# Patient Record
Sex: MALE | Race: BLACK OR AFRICAN AMERICAN | NOT HISPANIC OR LATINO | Employment: OTHER | URBAN - METROPOLITAN AREA
[De-identification: names, ages, dates, MRNs, and addresses within clinical notes are randomized per-mention and may not be internally consistent; named-entity substitution may affect disease eponyms.]

---

## 2017-07-29 ENCOUNTER — OFFICE VISIT (OUTPATIENT)
Dept: URGENT CARE | Facility: URGENT CARE | Age: 27
End: 2017-07-29
Payer: COMMERCIAL

## 2017-07-29 VITALS
TEMPERATURE: 97.9 F | WEIGHT: 190 LBS | SYSTOLIC BLOOD PRESSURE: 118 MMHG | DIASTOLIC BLOOD PRESSURE: 68 MMHG | HEART RATE: 68 BPM | BODY MASS INDEX: 27.26 KG/M2 | OXYGEN SATURATION: 98 %

## 2017-07-29 DIAGNOSIS — R10.13 ABDOMINAL PAIN, EPIGASTRIC: ICD-10-CM

## 2017-07-29 DIAGNOSIS — R07.9 CHEST PAIN, UNSPECIFIED TYPE: Primary | ICD-10-CM

## 2017-07-29 LAB
ALBUMIN SERPL-MCNC: 3.7 G/DL (ref 3.4–5)
ALP SERPL-CCNC: 81 U/L (ref 40–150)
ALT SERPL W P-5'-P-CCNC: 23 U/L (ref 0–70)
AMYLASE SERPL-CCNC: 64 U/L (ref 30–110)
ANION GAP SERPL CALCULATED.3IONS-SCNC: 4 MMOL/L (ref 3–14)
AST SERPL W P-5'-P-CCNC: 14 U/L (ref 0–45)
BASOPHILS # BLD AUTO: 0 10E9/L (ref 0–0.2)
BASOPHILS NFR BLD AUTO: 0.6 %
BILIRUB SERPL-MCNC: 0.3 MG/DL (ref 0.2–1.3)
BUN SERPL-MCNC: 10 MG/DL (ref 7–30)
CALCIUM SERPL-MCNC: 8.8 MG/DL (ref 8.5–10.1)
CHLORIDE SERPL-SCNC: 104 MMOL/L (ref 94–109)
CO2 SERPL-SCNC: 30 MMOL/L (ref 20–32)
CREAT SERPL-MCNC: 0.83 MG/DL (ref 0.66–1.25)
DIFFERENTIAL METHOD BLD: NORMAL
EOSINOPHIL # BLD AUTO: 0.2 10E9/L (ref 0–0.7)
EOSINOPHIL NFR BLD AUTO: 3 %
ERYTHROCYTE [DISTWIDTH] IN BLOOD BY AUTOMATED COUNT: 12.7 % (ref 10–15)
GFR SERPL CREATININE-BSD FRML MDRD: ABNORMAL ML/MIN/1.7M2
GLUCOSE SERPL-MCNC: 106 MG/DL (ref 70–99)
HCT VFR BLD AUTO: 40.2 % (ref 40–53)
HGB BLD-MCNC: 13.4 G/DL (ref 13.3–17.7)
LYMPHOCYTES # BLD AUTO: 1.8 10E9/L (ref 0.8–5.3)
LYMPHOCYTES NFR BLD AUTO: 34.8 %
MCH RBC QN AUTO: 28.2 PG (ref 26.5–33)
MCHC RBC AUTO-ENTMCNC: 33.3 G/DL (ref 31.5–36.5)
MCV RBC AUTO: 85 FL (ref 78–100)
MONOCYTES # BLD AUTO: 0.4 10E9/L (ref 0–1.3)
MONOCYTES NFR BLD AUTO: 8.1 %
NEUTROPHILS # BLD AUTO: 2.7 10E9/L (ref 1.6–8.3)
NEUTROPHILS NFR BLD AUTO: 53.5 %
PLATELET # BLD AUTO: 203 10E9/L (ref 150–450)
POTASSIUM SERPL-SCNC: 4.3 MMOL/L (ref 3.4–5.3)
PROT SERPL-MCNC: 8 G/DL (ref 6.8–8.8)
RBC # BLD AUTO: 4.75 10E12/L (ref 4.4–5.9)
SODIUM SERPL-SCNC: 138 MMOL/L (ref 133–144)
WBC # BLD AUTO: 5.1 10E9/L (ref 4–11)

## 2017-07-29 PROCEDURE — 80053 COMPREHEN METABOLIC PANEL: CPT | Performed by: FAMILY MEDICINE

## 2017-07-29 PROCEDURE — 82150 ASSAY OF AMYLASE: CPT | Performed by: FAMILY MEDICINE

## 2017-07-29 PROCEDURE — 85025 COMPLETE CBC W/AUTO DIFF WBC: CPT | Performed by: FAMILY MEDICINE

## 2017-07-29 PROCEDURE — 99214 OFFICE O/P EST MOD 30 MIN: CPT | Performed by: FAMILY MEDICINE

## 2017-07-29 PROCEDURE — 93000 ELECTROCARDIOGRAM COMPLETE: CPT | Performed by: FAMILY MEDICINE

## 2017-07-29 PROCEDURE — 36415 COLL VENOUS BLD VENIPUNCTURE: CPT | Performed by: FAMILY MEDICINE

## 2017-07-29 NOTE — MR AVS SNAPSHOT
"              After Visit Summary   7/29/2017    Stefani Kirkpatrick    MRN: [id number]           Patient Information     Date Of Birth          1990        Visit Information        Provider Department      7/29/2017 3:35 PM Nolberto Galvan,  [facility]        Today's Diagnoses     Chest pain, unspecified type    -  1    Abdominal pain, epigastric           Follow-ups after your visit        Future tests that were ordered for you today     Open Future Orders        Priority Expected Expires Ordered    H Pylori antigen, stool Routine  8/28/2017 7/29/2017            Who to contact     If you have questions or need follow up information about today's clinic visit or your schedule please contact [facility] directly at [phone].  Normal or non-critical lab and imaging results will be communicated to you by MyChart, letter or phone within 4 business days after the clinic has received the results. If you do not hear from us within 7 days, please contact the clinic through MyChart or phone. If you have a critical or abnormal lab result, we will notify you by phone as soon as possible.  Submit refill requests through [employer] or call your pharmacy and they will forward the refill request to us. Please allow 3 business days for your refill to be completed.          Additional Information About Your Visit        MyChart Information     [employer] lets you send messages to your doctor, view your test results, renew your prescriptions, schedule appointments and more. To sign up, go to [email or web address] . Click on \"Log in\" on the left side of the screen, which will take you to the Welcome page. Then click on \"Sign up Now\" on the right side of the page.     You will be asked to enter the access code listed below, as well as some personal information. Please follow the directions to create your username and password.     Your access code is: [email or web address]  Expires: " 10/27/2017  5:08 PM     Your access code will  in 90 days. If you need help or a new code, please call your [facility] clinic or [phone].        Care EveryWhere ID     This is your Care EveryWhere ID. This could be used by other organizations to access your [facility] medical records  [id number]        Your Vitals Were     Pulse Temperature Pulse Oximetry BMI (Body Mass Index)          68 97.9  F (36.6  C) (Oral) 98% 27.26 kg/m2         Blood Pressure from Last 3 Encounters:   17 118/68   16 107/69   10/31/16 102/62    Weight from Last 3 Encounters:   17 190 lb (86.2 kg)   16 176 lb (79.8 kg)   10/31/16 178 lb (80.7 kg)              We Performed the Following     Amylase     CBC with platelets differential     Comprehensive metabolic panel     EKG 12-lead complete w/read - Clinics          Today's Medication Changes          These changes are accurate as of: 17  5:08 PM.  If you have any questions, ask your nurse or doctor.               These medicines have changed or have updated prescriptions.        Dose/Directions    * omeprazole 20 MG CR capsule   Commonly known as:  priLOSEC   This may have changed:  Another medication with the same name was added. Make sure you understand how and when to take each.   Used for:  Heartburn   Changed by:  Nolberto Chadwick MD        Dose:  20 mg   Take 1 capsule by mouth daily. Take before meal.   Quantity:  30 capsule   Refills:  5       * omeprazole 20 MG CR capsule   Commonly known as:  priLOSEC   This may have changed:  You were already taking a medication with the same name, and this prescription was added. Make sure you understand how and when to take each.   Used for:  Abdominal pain, epigastric   Changed by:  Nolberto Galvan DO        Dose:  20 mg   Take 1 capsule (20 mg) by mouth daily   Quantity:  30 capsule   Refills:  1       * Notice:  This list has 2 medication(s) that are the same as other medications prescribed for  you. Read the directions carefully, and ask your doctor or other care provider to review them with you.         Where to get your medicines      Some of these will need a paper prescription and others can be bought over the counter.  Ask your nurse if you have questions.     Bring a paper prescription for each of these medications     omeprazole 20 MG CR capsule                Primary Care Provider Office Phone # Fax #    Oliver Recinos [phone]       [facility] [place] [address]  [place] [address]        Equal Access to Services     [facility]: Hadii aad ku hadasho Soomaali, waaxda luqadaha, qaybta kaalmada adeegyada, waxay casiin hayjerzy zavaal . So [facility] [phone].    ATENCIÓN: Si habla español, tiene a munoz disposición servicios gratuitos de asistencia lingüística. Llame al [phone].    We comply with applicable federal civil rights laws and [place] laws. We do not discriminate on the basis of race, color, national origin, age, disability sex, sexual orientation or gender identity.            Thank you!     Thank you for choosing [facility]  for your care. Our goal is always to provide you with excellent care. Hearing back from our patients is one way we can continue to improve our services. Please take a few minutes to complete the written survey that you may receive in the mail after your visit with us. Thank you!             Your Updated Medication List - Protect others around you: Learn how to safely use, store and throw away your medicines at www.disposemymeds.org.          This list is accurate as of: 7/29/17  5:08 PM.  Always use your most recent med list.                   Brand Name Dispense Instructions for use Diagnosis    cetirizine 10 MG tablet    zyrTEC    30 tablet    Take 1 tablet (10 mg) by mouth every evening    Rash and nonspecific skin eruption       ibuprofen 600 MG tablet    ADVIL/MOTRIN    30  tablet    Take 1 tablet (600 mg) by mouth every 6 hours as needed for moderate pain        lidocaine visc 2% & diphenhydramine 12.5mg/5mL & maalox/mylanta w/simethicone (1:1:1 v/v/v) Susp compounding kit     240 mL    Swish and swallow 5-10 mLs in mouth every 6 hours as needed        naproxen 500 MG tablet    NAPROSYN    30 tablet    Take 1 tablet (500 mg) by mouth 2 times daily as needed for moderate pain    Throat pain, Dysuria       * omeprazole 20 MG CR capsule    priLOSEC    30 capsule    Take 1 capsule by mouth daily. Take before meal.    Heartburn       * omeprazole 20 MG CR capsule    priLOSEC    30 capsule    Take 1 capsule (20 mg) by mouth daily    Abdominal pain, epigastric       predniSONE 20 MG tablet    DELTASONE    10 tablet    Take 1 tablet (20 mg) by mouth 2 times daily    Rash and nonspecific skin eruption       sulfamethoxazole-trimethoprim 800-160 MG per tablet    BACTRIM DS/SEPTRA DS    14 tablet    Take 1 tablet by mouth 2 times daily    Dysuria       * Notice:  This list has 2 medication(s) that are the same as other medications prescribed for you. Read the directions carefully, and ask your doctor or other care provider to review them with you.

## 2017-07-29 NOTE — PROGRESS NOTES
SUBJECTIVE: Stefani Kirkpatrick is a 27 year old male presenting with a chief complaint of CP and abd pain.  Onset of symptoms was day(s) ago.  Course of illness is worsening.    Severity moderate  Current and Associated symptoms: none  Treatment measures tried include None tried.  Predisposing factors include None.    Past Medical History:   Diagnosis Date     NO ACTIVE PROBLEMS      Allergies   Allergen Reactions     Nkda [No Known Drug Allergies]      Bactrim [Sulfamethoxazole W/Trimethoprim] Rash     Social History   Substance Use Topics     Smoking status: Never Smoker     Smokeless tobacco: Never Used     Alcohol use No       ROS:  SKIN: no rash  GI: no vomiting/diarrhea    OBJECTIVE:  /68 (BP Location: Left arm, Patient Position: Chair, Cuff Size: Adult Regular)  Pulse 68  Temp 97.9  F (36.6  C) (Oral)  Wt 190 lb (86.2 kg)  SpO2 98%  BMI 27.26 kg/m2   GENERAL APPEARANCE: healthy, alert and no distress  EYES: EOMI,  PERRL, conjunctiva clear  HENT: ear canals and TM's normal.  Nose and mouth without ulcers, erythema or lesions  NECK: supple, nontender, no lymphadenopathy  RESP: lungs clear to auscultation - no rales, rhonchi or wheezes  CV: regular rates and rhythm, normal S1 S2, no murmur noted  ABDOMEN:  soft, nontender, no HSM or masses and bowel sounds normal  SKIN: no suspicious lesions or rashes         EKG Interpretation:      Interpreted by Nolberto Galvan    Symptoms at time of EKG: as above   Rhythm: Normal sinus   Rate: Normal  Axis: Normal  Ectopy: None  Conduction: Normal  ST Segments/ T Waves: No ST-T wave changes and No acute ischemic changes  Q Waves: None  Comparison to prior: No old EKG available    Clinical Impression: normal EKG      ICD-10-CM    1. Chest pain, unspecified type R07.9 CBC with platelets differential     Comprehensive metabolic panel     Amylase     EKG 12-lead complete w/read - Clinics   2. Abdominal pain, epigastric R10.13 CBC with platelets differential      Comprehensive metabolic panel     Amylase     EKG 12-lead complete w/read - Clinics     H Pylori antigen, stool     omeprazole (PRILOSEC) 20 MG CR capsule     Fluids/Rest, f/u if worse/not any better

## 2017-07-29 NOTE — NURSING NOTE
"Chief Complaint   Patient presents with     Chest Pain     feels as if he has a chest cold. the pain is moving between the back and the center of the chest       Initial /68 (BP Location: Left arm, Patient Position: Chair, Cuff Size: Adult Regular)  Pulse 68  Temp 97.9  F (36.6  C) (Oral)  Wt 190 lb (86.2 kg)  SpO2 98%  BMI 27.26 kg/m2 Estimated body mass index is 27.26 kg/(m^2) as calculated from the following:    Height as of 11/7/16: 5' 10\" (1.778 m).    Weight as of this encounter: 190 lb (86.2 kg).  Medication Reconciliation: complete    "

## 2017-08-02 ENCOUNTER — APPOINTMENT (OUTPATIENT)
Dept: GENERAL RADIOLOGY | Facility: CLINIC | Age: 27
End: 2017-08-02
Attending: EMERGENCY MEDICINE
Payer: COMMERCIAL

## 2017-08-02 ENCOUNTER — HOSPITAL ENCOUNTER (EMERGENCY)
Facility: CLINIC | Age: 27
Discharge: HOME | End: 2017-08-02
Attending: EMERGENCY MEDICINE | Admitting: EMERGENCY MEDICINE
Payer: COMMERCIAL

## 2017-08-02 VITALS
WEIGHT: 187 LBS | OXYGEN SATURATION: 99 % | DIASTOLIC BLOOD PRESSURE: 98 MMHG | SYSTOLIC BLOOD PRESSURE: 128 MMHG | TEMPERATURE: 97.6 F | RESPIRATION RATE: 16 BRPM | BODY MASS INDEX: 26.18 KG/M2 | HEART RATE: 62 BPM | HEIGHT: 71 IN

## 2017-08-02 DIAGNOSIS — R07.9 CHEST PAIN, UNSPECIFIED TYPE: ICD-10-CM

## 2017-08-02 LAB
D DIMER PPP FEU-MCNC: NORMAL UG/ML FEU (ref 0–0.5)
INTERPRETATION ECG - MUSE: NORMAL
TROPONIN I SERPL-MCNC: NORMAL UG/L (ref 0–0.04)

## 2017-08-02 PROCEDURE — 85379 FIBRIN DEGRADATION QUANT: CPT | Performed by: EMERGENCY MEDICINE

## 2017-08-02 PROCEDURE — 84484 ASSAY OF TROPONIN QUANT: CPT | Performed by: EMERGENCY MEDICINE

## 2017-08-02 PROCEDURE — 71020 XR CHEST 2 VW: CPT

## 2017-08-02 PROCEDURE — 25000125 ZZHC RX 250: Performed by: EMERGENCY MEDICINE

## 2017-08-02 PROCEDURE — 25000132 ZZH RX MED GY IP 250 OP 250 PS 637: Performed by: EMERGENCY MEDICINE

## 2017-08-02 PROCEDURE — 99284 EMERGENCY DEPT VISIT MOD MDM: CPT | Mod: 25

## 2017-08-02 RX ORDER — ALUMINA, MAGNESIA, AND SIMETHICONE 2400; 2400; 240 MG/30ML; MG/30ML; MG/30ML
15 SUSPENSION ORAL ONCE
Status: COMPLETED | OUTPATIENT
Start: 2017-08-02 | End: 2017-08-02

## 2017-08-02 RX ADMIN — ALUMINUM HYDROXIDE, MAGNESIUM HYDROXIDE, AND DIMETHICONE 15 ML: 400; 400; 40 SUSPENSION ORAL at 01:33

## 2017-08-02 RX ADMIN — LIDOCAINE HYDROCHLORIDE 15 ML: 20 SOLUTION ORAL; TOPICAL at 01:33

## 2017-08-02 ASSESSMENT — ENCOUNTER SYMPTOMS
COUGH: 0
FEVER: 0
VOMITING: 0
ABDOMINAL PAIN: 0
BACK PAIN: 1
SHORTNESS OF BREATH: 1
CHILLS: 0
NAUSEA: 0

## 2017-08-02 NOTE — ED AVS SNAPSHOT
Emergency Department    [address] [place] [address]    Phone:  [phone]    Fax:  [phone]                                       Stefani Kirkpatrick   MRN: [id number]    Department:   Emergency Department   Date of Visit:  8/2/2017           After Visit Summary Signature Page     I have received my discharge instructions, and my questions have been answered. I have discussed any challenges I see with this plan with the nurse or doctor.    ..........................................................................................................................................  Patient/Patient Representative Signature      ..........................................................................................................................................  Patient Representative Print Name and Relationship to Patient    ..................................................               ................................................  Date                                            Time    ..........................................................................................................................................  Reviewed by Signature/Title    ...................................................              ..............................................  Date                                                            Time

## 2017-08-02 NOTE — ED PROVIDER NOTES
"  History     Chief Complaint:  Shortness of Breath (says he ahs felt short of breath x 2 weeks, chest pain throughout entire chest and back. pain is worse when he gets cold, currently he feels cold. )    TISH Kirkpatrick is a 27 year old male who presents with shortness of breath. The patient reports that he has been experiencing this shortness of breath for 2 to 3 weeks now with some associated intermittent back and chest pain. His symptoms typically begin and end with no apparent causes and last for an hour or two at a time. While here in the ED here denies any cough, fever, chills, nausea, or vomiting. He has been taking ibuprofen and tylenol for pain management with little relief.    Allergies:  Bactrim     Medications:    Prilosec  Zyrtec  Deltasone  Bactrim  Naprosyn    Past Medical History:    The patient denies any relevant past medical history.    Past Surgical History:    Appendectomy    Family History:    The patient denies any relevant family medical history.    Social History:  Smoking Status: No  Smokeless Tobacco: No  Alcohol Use: No  Marital Status:  Single     Review of Systems   Constitutional: Negative for chills and fever.   Respiratory: Positive for shortness of breath. Negative for cough.    Cardiovascular: Positive for chest pain. Negative for leg swelling.   Gastrointestinal: Negative for abdominal pain, nausea and vomiting.   Musculoskeletal: Positive for back pain.   All other systems reviewed and are negative.    Physical Exam   Vitals:  Patient Vitals for the past 24 hrs:   BP Temp Temp src Pulse Resp SpO2 Height Weight   08/02/17 0055 (!) 128/98 97.6  F (36.4  C) Oral 62 16 99 % 1.803 m (5' 11\") 84.8 kg (187 lb)     Physical Exam  Constitutional: The patient is oriented to person, place, and time.   HENT:   Head: Atraumatic  Right Ear: Normal  Left Ear: Normal  Nose: Nose normal.   Mouth/Throat: Oropharynx is clear and moist. No erythema or exudate.   Eyes: Conjunctivae and EOM are " normal. Pupils are equal, round, and reactive to light. No discharge  Neck: Normal range of motion. Neck supple.   Cardiovascular: Normal rate, regular rhythm, no murmur gallops or rubs. Intact distal pulses.    Pulmonary/Chest: CTA bilaterally. No wheezes rale or rhonchi.  Abdominal: Soft. Non tender.  No masses   Musculoskeletal: No edema. No bony deformity. Normal range of motion  Lymphadenopathy:     The patient has no cervical adenopathy.   Neurological: The patient is alert and oriented to person, place, and time. The patient has normal strength and normal reflexes. No cranial nerve deficit. Coordination normal.  Skin: Skin is warm and dry. No rash noted. The patient is not diaphoretic.   Psychiatric: The patient has a normal mood and affect.    Emergency Department Course     Imaging:  Radiology findings were communicated with the patient who voiced understanding of the findings.  XR Chest 2 views:  No evidence of active cardiopulmonary disease.  Per Radiologist.     Laboratory:  Laboratory findings were communicated with the patient who voiced understanding of the findings.  Troponin (Collected 0130): <0.015  D Dimer (Collected 0130): <0.3    Interventions:  0133 Mylanta, 15 mL, PO  0133 Xylocaine, 14 mL, PO     Emergency Department Course:  Nursing notes and vitals reviewed.  0121 I had my initial encounter with the patient.  I performed an exam of the patient as documented above.   IV was inserted and blood was drawn for laboratory testing, results above.  The patient was sent for a XR while in the emergency department, results above.     I discussed the treatment plan with the patient. They expressed understanding of this plan and consented to discharge. They will be discharged home with instructions for care and follow up. In addition, the patient will return to the emergency department if their symptoms persist, worsen, if new symptoms arise or if there is any concern.  All questions were answered.    I  personally reviewed the laboratory results with the Patient and answered all related questions prior to discharge.    Impression & Plan      Medical Decision Making:  Stefani Kirkpatrick is a 27 year old male who presents to the emergency department today with two weeks of intermittent mid sternal chest discomfort which occasionally makes him feel short of breath. This seems to be somewhat random in nature and I suspect this likely represents reflux gastritis. His EKG is unremarkable and chest X Ray is normal. He has negative troponin and D dimer without and real risk factors for CAD or PE. Patient was given G cocktail with significant relief of his symptoms and I feel he can safely be discharged home. I've recommended liquid antacids for acute pain, continue his omeprazole, follow up with primary physician, and return for problems.    Diagnosis:    ICD-10-CM    1. Chest pain, unspecified type R07.9      Disposition:   Discharge    Scribe Disclosure:  BHANU, Juan Luis Perry, am serving as a scribe at 1:25 AM on 8/2/2017 to document services personally performed by Aaron Zimmer MD, based on my observations and the provider's statements to me.    8/2/2017    EMERGENCY DEPARTMENT       Aaron Zimmer MD  08/02/17 0533

## 2017-08-02 NOTE — DISCHARGE INSTRUCTIONS
"  GERD (Adult)    The esophagus is a tube that carries food from the mouth to the stomach. A valve at the lower end of the esophagus prevents stomach acid from flowing upward. When this valve doesn't work properly, stomach contents may repeatedly flow back up (reflux) into the esophagus. This is called gastroesophageal reflux disease (GERD). GERD can irritate the esophagus. It can cause problems with swallowing or breathing. In severe cases, GERD can cause recurrent pneumonia or other serious problems.  Symptoms of reflux include burning, pressure or sharp pain in the upper abdomen or mid to lower chest. The pain can spread to the neck, back, or shoulder. There may be belching, an acid taste in the back of the throat, chronic cough, or sore throat or hoarseness. GERD symptoms often occur during the day after a big meal. They can also occur at night when lying down.   Home care  Lifestyle changes can help reduce symptoms. If needed, medicines may be prescribed. Symptoms often improve with treatment, but if treatment is stopped, the symptoms often return after a few months. So most persons with GERD will need to continue treatment.  Lifestyle changes    Limit or avoid fatty, fried, and spicy foods, as well as coffee, chocolate, mint, and foods with high acid content such as tomatoes and citrus fruit and juices (orange, grapefruit, lemon).    Don t eat large meals, especially at night. Frequent, smaller meals are best. Do not lie down right after eating. And don t eat anything 3 hours before going to bed.    Avoid drinking alcohol and smoking. As much as possible, stay away from second hand smoke.    If you are overweight, losing weight will reduce symptoms.     Avoid wearing tight clothing around your stomach area.    If your symptoms occur during sleep, use a foam wedge to elevate your upper body (not just your head.) Or, place 4\" blocks under the head of your bed.  Medicines  If needed, medicines can help relieve " the symptoms of GERD and prevent damage to the esophagus. Discuss a medicine plan with your healthcare provider. This may include one or more of the following medicines:    Antacids to help neutralize the normal acids in your stomach.    Acid blockers (H2 blockers) to decrease acid production.    Acid inhibitors (PPIs) to decrease acid production in a different way than the blockers. They may work better, but can take a little longer to take effect.  Take an antacid 30-60 minutes after eating and at bedtime, but not at the same time as an acid blocker.  Try not to take medicines such as ibuprofen and aspirin. If you are taking aspirin for your heart or other medical reasons, talk to your healthcare provider about stopping it.  Follow-up care  Follow up with your healthcare provider or as advised by our staff.  When to seek medical advice  Call your healthcare provider if any of the following occur:    Stomach pain gets worse or moves to the lower right abdomen (appendix area)    Chest pain appears or gets worse, or spreads to the back, neck, shoulder, or arm    Frequent vomiting (can t keep down liquids)    Blood in the stool or vomit (red or black in color)    Feeling weak or dizzy    Fever of 100.4 F (38 C) or higher, or as directed by your healthcare provider  Date Last Reviewed: 6/23/2015 2000-2017 [employer]. [address] [place] [address], [place] [address]. All rights reserved. This information is not intended as a substitute for professional medical care. Always follow your healthcare professional's instructions.

## 2017-08-02 NOTE — ED AVS SNAPSHOT
Emergency Department    [address] [facility] [place] [address]    Phone:  [phone]    Fax:  [phone]                                       Stefani Kirkpatrick   MRN: [id number]    Department:   Emergency Department   Date of Visit:  8/2/2017           Patient Information     Date Of Birth          1990        Your diagnoses for this visit were:     Chest pain, unspecified type        You were seen by Aaron Zimmer MD.      Follow-up Information     Follow up with Oliver Recinos MD.    Specialty:  Family Practice    Contact information:    [facility] [place]  [address] [place] [address] DR  [place] [address]  [phone]          Discharge Instructions         GERD (Adult)    The esophagus is a tube that carries food from the mouth to the stomach. A valve at the lower end of the esophagus prevents stomach acid from flowing upward. When this valve doesn't work properly, stomach contents may repeatedly flow back up (reflux) into the esophagus. This is called gastroesophageal reflux disease (GERD). GERD can irritate the esophagus. It can cause problems with swallowing or breathing. In severe cases, GERD can cause recurrent pneumonia or other serious problems.  Symptoms of reflux include burning, pressure or sharp pain in the upper abdomen or mid to lower chest. The pain can spread to the neck, back, or shoulder. There may be belching, an acid taste in the back of the throat, chronic cough, or sore throat or hoarseness. GERD symptoms often occur during the day after a big meal. They can also occur at night when lying down.   Home care  Lifestyle changes can help reduce symptoms. If needed, medicines may be prescribed. Symptoms often improve with treatment, but if treatment is stopped, the symptoms often return after a few months. So most persons with GERD will need to continue treatment.  Lifestyle changes    Limit or avoid fatty, fried, and spicy foods, as well as coffee, chocolate, mint,  "and foods with high acid content such as tomatoes and citrus fruit and juices (orange, grapefruit, lemon).    Don t eat large meals, especially at night. Frequent, smaller meals are best. Do not lie down right after eating. And don t eat anything 3 hours before going to bed.    Avoid drinking alcohol and smoking. As much as possible, stay away from second hand smoke.    If you are overweight, losing weight will reduce symptoms.     Avoid wearing tight clothing around your stomach area.    If your symptoms occur during sleep, use a foam wedge to elevate your upper body (not just your head.) Or, place 4\" blocks under the head of your bed.  Medicines  If needed, medicines can help relieve the symptoms of GERD and prevent damage to the esophagus. Discuss a medicine plan with your healthcare provider. This may include one or more of the following medicines:    Antacids to help neutralize the normal acids in your stomach.    Acid blockers (H2 blockers) to decrease acid production.    Acid inhibitors (PPIs) to decrease acid production in a different way than the blockers. They may work better, but can take a little longer to take effect.  Take an antacid 30-60 minutes after eating and at bedtime, but not at the same time as an acid blocker.  Try not to take medicines such as ibuprofen and aspirin. If you are taking aspirin for your heart or other medical reasons, talk to your healthcare provider about stopping it.  Follow-up care  Follow up with your healthcare provider or as advised by our staff.  When to seek medical advice  Call your healthcare provider if any of the following occur:    Stomach pain gets worse or moves to the lower right abdomen (appendix area)    Chest pain appears or gets worse, or spreads to the back, neck, shoulder, or arm    Frequent vomiting (can t keep down liquids)    Blood in the stool or vomit (red or black in color)    Feeling weak or dizzy    Fever of 100.4 F (38 C) or higher, or as directed " by your healthcare provider  Date Last Reviewed: 6/23/2015 2000-2017 [employer]. [address] [place] [address], [place] [address]. All rights reserved. This information is not intended as a substitute for professional medical care. Always follow your healthcare professional's instructions.          24 Hour Appointment Hotline       To make an appointment at any [facility], call [phone] ([phone]). If you don't have a family doctor or clinic, we will help you find one. [facility] are conveniently located to serve the needs of you and your family.             Review of your medicines      Our records show that you are taking the medicines listed below. If these are incorrect, please call your family doctor or clinic.        Dose / Directions Last dose taken    cetirizine 10 MG tablet   Commonly known as:  zyrTEC   Dose:  10 mg   Quantity:  30 tablet        Take 1 tablet (10 mg) by mouth every evening   Refills:  0        ibuprofen 600 MG tablet   Commonly known as:  ADVIL/MOTRIN   Dose:  600 mg   Quantity:  30 tablet        Take 1 tablet (600 mg) by mouth every 6 hours as needed for moderate pain   Refills:  1        lidocaine visc 2% & diphenhydramine 12.5mg/5mL & maalox/mylanta w/simethicone (1:1:1 v/v/v) Susp compounding kit   Dose:  5-10 mL   Quantity:  240 mL        Swish and swallow 5-10 mLs in mouth every 6 hours as needed   Refills:  1        naproxen 500 MG tablet   Commonly known as:  NAPROSYN   Dose:  500 mg   Quantity:  30 tablet        Take 1 tablet (500 mg) by mouth 2 times daily as needed for moderate pain   Refills:  1        * omeprazole 20 MG CR capsule   Commonly known as:  priLOSEC   Dose:  20 mg   Quantity:  30 capsule        Take 1 capsule by mouth daily. Take before meal.   Refills:  5        * omeprazole 20 MG CR capsule   Commonly known as:  priLOSEC   Dose:  20 mg   Quantity:  30 capsule        Take 1 capsule (20 mg) by mouth daily   Refills:  1         predniSONE 20 MG tablet   Commonly known as:  DELTASONE   Dose:  20 mg   Quantity:  10 tablet        Take 1 tablet (20 mg) by mouth 2 times daily   Refills:  0        sulfamethoxazole-trimethoprim 800-160 MG per tablet   Commonly known as:  BACTRIM DS/SEPTRA DS   Dose:  1 tablet   Quantity:  14 tablet        Take 1 tablet by mouth 2 times daily   Refills:  0        * Notice:  This list has 2 medication(s) that are the same as other medications prescribed for you. Read the directions carefully, and ask your doctor or other care provider to review them with you.            Procedures and tests performed during your visit     D dimer quantitative    EKG 12 lead    Troponin I    XR Chest 2 Views      Orders Needing Specimen Collection     None      Pending Results     Date and Time Order Name Status Description    8/2/2017 0125 XR Chest 2 Views Preliminary             Pending Culture Results     No orders found from 7/31/2017 to 8/3/2017.            Pending Results Instructions     If you had any lab results that were not finalized at the time of your Discharge, you can call the ED Lab Result RN at [phone]. You will be contacted by this team for any positive Lab results or changes in treatment. The nurses are available 7 days a week from 10A to 6:30P.  You can leave a message 24 hours per day and they will return your call.        Test Results From Your Hospital Stay        8/2/2017  2:01 AM      Component Results     Component Value Ref Range & Units Status    Troponin I ES  0.000 - 0.045 ug/L Final    <0.015  The 99th percentile for upper reference range is 0.045 ug/L.  Troponin values in   the range of 0.045 - 0.120 ug/L may be associated with risks of adverse   clinical events.           8/2/2017  1:56 AM      Component Results     Component Value Ref Range & Units Status    D Dimer  0.0 - 0.50 ug/ml FEU Final    <0.3  This D-dimer assay is intended for use in conjunction with a clinical pretest   probability  assessment model to exclude pulmonary embolism (PE) and deep venous   thrombosis (DVT) in outpatients suspected of PE or DVT. The cut-off value is   0.5 ug/mL FEU.           8/2/2017  2:04 AM      Narrative     CHEST 2 VIEWS  8/2/2017 1:39 AM     HISTORY: Chest pain.    COMPARISON: None.    FINDINGS: Hypoinflated lungs. The lungs are clear. Borderline  cardiomegaly.        Impression     IMPRESSION: No evidence of active cardiopulmonary disease.                Clinical Quality Measure: Blood Pressure Screening     Your blood pressure was checked while you were in the emergency department today. The last reading we obtained was  BP: (!) 128/98 . Please read the guidelines below about what these numbers mean and what you should do about them.  If your systolic blood pressure (the top number) is less than 120 and your diastolic blood pressure (the bottom number) is less than 80, then your blood pressure is normal. There is nothing more that you need to do about it.  If your systolic blood pressure (the top number) is 120-139 or your diastolic blood pressure (the bottom number) is 80-89, your blood pressure may be higher than it should be. You should have your blood pressure rechecked within a year by a primary care provider.  If your systolic blood pressure (the top number) is 140 or greater or your diastolic blood pressure (the bottom number) is 90 or greater, you may have high blood pressure. High blood pressure is treatable, but if left untreated over time it can put you at risk for heart attack, stroke, or kidney failure. You should have your blood pressure rechecked by a primary care provider within the next 4 weeks.  If your provider in the emergency department today gave you specific instructions to follow-up with your doctor or provider even sooner than that, you should follow that instruction and not wait for up to 4 weeks for your follow-up visit.        Thank you for choosing [facility]       Thank you for  "choosing [facility] for your care. Our goal is always to provide you with excellent care. Hearing back from our patients is one way we can continue to improve our services. Please take a few minutes to complete the written survey that you may receive in the mail after you visit with us. Thank you!        W[employer]ivaharSuitey Information     NovaShunt lets you send messages to your doctor, view your test results, renew your prescriptions, schedule appointments and more. To sign up, go to [email or web address] . Click on \"Log in\" on the left side of the screen, which will take you to the Welcome page. Then click on \"Sign up Now\" on the right side of the page.     You will be asked to enter the access code listed below, as well as some personal information. Please follow the directions to create your username and password.     Your access code is: [email or web address]  Expires: 10/27/2017  5:08 PM     Your access code will  in 90 days. If you need help or a new code, please call your [facility] clinic or [phone].        Care EveryWhere ID     This is your Care EveryWhere ID. This could be used by other organizations to access your [facility] medical records  [id number]        Equal Access to Services     SHAKILA ROBERTS : Surya Murray, arnaud agarwal, qafazal kajoelle zuleta, thaddeus goss. So [facility] [place] [facility] [phone].    ATENCIÓN: Si habla español, tiene a munoz disposición servicios gratuitos de asistencia lingüística. Llame al [phone].    We comply with applicable federal civil rights laws and [place] laws. We do not discriminate on the basis of race, color, national origin, age, disability sex, sexual orientation or gender identity.            After Visit Summary       This is your record. Keep this with you and show to your community pharmacist(s) and doctor(s) at your next visit.                  "

## 2018-07-23 ENCOUNTER — OFFICE VISIT (OUTPATIENT)
Dept: FAMILY MEDICINE | Facility: CLINIC | Age: 28
End: 2018-07-23
Payer: COMMERCIAL

## 2018-07-23 VITALS
SYSTOLIC BLOOD PRESSURE: 118 MMHG | BODY MASS INDEX: 26.18 KG/M2 | HEART RATE: 74 BPM | OXYGEN SATURATION: 99 % | DIASTOLIC BLOOD PRESSURE: 80 MMHG | TEMPERATURE: 98.7 F | HEIGHT: 71 IN | RESPIRATION RATE: 20 BRPM | WEIGHT: 187 LBS

## 2018-07-23 DIAGNOSIS — N50.82 SCROTAL PAIN: Primary | ICD-10-CM

## 2018-07-23 PROCEDURE — 99213 OFFICE O/P EST LOW 20 MIN: CPT | Performed by: INTERNAL MEDICINE

## 2018-07-23 NOTE — PATIENT INSTRUCTIONS
Have the ultrasound of your scrotum/testicles done.                I will let you know the result.

## 2018-07-23 NOTE — MR AVS SNAPSHOT
After Visit Summary   7/23/2018    Stefani Kirkpatrick    MRN: [id number]           Patient Information     Date Of Birth          1990        Visit Information        Provider Department      7/23/2018 4:00 PM Nolberto Conway MD; LANGUAGE [facility]        Today's Diagnoses     Scrotal pain    -  1      Care Instructions    Have the ultrasound of your scrotum/testicles done.                I will let you know the result.           Follow-ups after your visit        Additional Services     RADIOLOGY REFERRAL       Your provider has referred you to: N: [facility]  [place] [phone]   [email or web address]                            PLEASE DO AN ULTRASOUND OF THE SCROTUM.           THIS PATIENT HAS RIGHT SIDED SCROTAL PAIN; CHRONIC  Please be aware that coverage of these services is subject to the terms and limitations of your health insurance plan.  Call member services at your health plan with any benefit or coverage questions.      Please bring the following to your appointment:    >>   Any x-rays, CTs or MRIs which have been performed.  Contact the facility where they were done to arrange for  prior to your scheduled appointment.    >>   List of current medications   >>   This referral request   >>   Any documents/labs given to you for this referral    Prior authorization is required for MRI/MRA, CT, Dexa Scans and Worker's Compensation cases.                  Who to contact     If you have questions or need follow up information about today's clinic visit or your schedule please contact [facility] directly at [phone].  Normal or non-critical lab and imaging results will be communicated to you by MyChart, letter or phone within 4 business days after the clinic has received the results. If you do not hear from us within 7 days, please contact the clinic through MyChart or phone. If you have a critical or  "abnormal lab result, we will notify you by phone as soon as possible.  Submit refill requests through [employer]ent or call your pharmacy and they will forward the refill request to us. Please allow 3 business days for your refill to be completed.          Additional Information About Your Visit        MyChart Information     Arrayent lets you send messages to your doctor, view your test results, renew your prescriptions, schedule appointments and more. To sign up, go to [email or web address] . Click on \"Log in\" on the left side of the screen, which will take you to the Welcome page. Then click on \"Sign up Now\" on the right side of the page.     You will be asked to enter the access code listed below, as well as some personal information. Please follow the directions to create your username and password.     Your access code is: [email or web address]  Expires: 10/21/2018  4:01 PM     Your access code will  in 90 days. If you need help or a new code, please call your [facility] clinic or [phone].        Care EveryWhere ID     This is your Care EveryWhere ID. This could be used by other organizations to access your [facility] medical records  [id number]        Your Vitals Were     Pulse Temperature Respirations Height Pulse Oximetry BMI (Body Mass Index)    74 98.7  F (37.1  C) 20 5' 11\" (1.803 m) 99% 26.08 kg/m2       Blood Pressure from Last 3 Encounters:   18 118/80   17 (!) 128/98   17 118/68    Weight from Last 3 Encounters:   18 187 lb (84.8 kg)   17 187 lb (84.8 kg)   17 190 lb (86.2 kg)              We Performed the Following     RADIOLOGY REFERRAL        Primary Care Provider Office Phone # Fax #    Oliver Recinos [phone]       [address]  [place] [address]        Equal Access to Services     [facility]: Surya Murray, arnaud agarwal, taryn zuleta, thaddeus goss. So [facility] " [phone].    ATENCIÓN: Si leigh annla klever, tiene a munoz disposición servicios gratuitos de asistencia lingüística. Мария al [phone].    We comply with applicable federal civil rights laws and [place] laws. We do not discriminate on the basis of race, color, national origin, age, disability, sex, sexual orientation, or gender identity.            Thank you!     Thank you for choosing [facility]  for your care. Our goal is always to provide you with excellent care. Hearing back from our patients is one way we can continue to improve our services. Please take a few minutes to complete the written survey that you may receive in the mail after your visit with us. Thank you!             Your Updated Medication List - Protect others around you: Learn how to safely use, store and throw away your medicines at www.disposemymeds.org.          This list is accurate as of 7/23/18  4:31 PM.  Always use your most recent med list.                   Brand Name Dispense Instructions for use Diagnosis    omeprazole 20 MG CR capsule    priLOSEC    30 capsule    Take 1 capsule by mouth daily. Take before meal.    Heartburn

## 2018-08-17 ENCOUNTER — TRANSFERRED RECORDS (OUTPATIENT)
Dept: HEALTH INFORMATION MANAGEMENT | Facility: CLINIC | Age: 28
End: 2018-08-17

## 2018-09-18 ENCOUNTER — APPOINTMENT (OUTPATIENT)
Dept: ULTRASOUND IMAGING | Facility: CLINIC | Age: 28
End: 2018-09-18
Attending: EMERGENCY MEDICINE
Payer: COMMERCIAL

## 2018-09-18 ENCOUNTER — ALLIED HEALTH/NURSE VISIT (OUTPATIENT)
Dept: NURSING | Facility: CLINIC | Age: 28
End: 2018-09-18
Payer: COMMERCIAL

## 2018-09-18 ENCOUNTER — HOSPITAL ENCOUNTER (EMERGENCY)
Facility: CLINIC | Age: 28
Discharge: HOME | End: 2018-09-18
Attending: EMERGENCY MEDICINE | Admitting: EMERGENCY MEDICINE
Payer: COMMERCIAL

## 2018-09-18 VITALS — TEMPERATURE: 97.9 F | DIASTOLIC BLOOD PRESSURE: 74 MMHG | SYSTOLIC BLOOD PRESSURE: 113 MMHG | HEART RATE: 67 BPM

## 2018-09-18 VITALS
HEIGHT: 71 IN | TEMPERATURE: 98.4 F | SYSTOLIC BLOOD PRESSURE: 120 MMHG | DIASTOLIC BLOOD PRESSURE: 72 MMHG | OXYGEN SATURATION: 99 % | BODY MASS INDEX: 25.2 KG/M2 | WEIGHT: 180 LBS | HEART RATE: 72 BPM | RESPIRATION RATE: 18 BRPM

## 2018-09-18 DIAGNOSIS — N45.1 EPIDIDYMITIS: ICD-10-CM

## 2018-09-18 DIAGNOSIS — N50.82 SCROTAL PAIN: Primary | ICD-10-CM

## 2018-09-18 LAB
ALBUMIN UR-MCNC: NEGATIVE MG/DL
APPEARANCE UR: CLEAR
BILIRUB UR QL STRIP: NEGATIVE
COLOR UR AUTO: NORMAL
GLUCOSE UR STRIP-MCNC: NEGATIVE MG/DL
HGB UR QL STRIP: NEGATIVE
KETONES UR STRIP-MCNC: NEGATIVE MG/DL
LEUKOCYTE ESTERASE UR QL STRIP: NEGATIVE
NITRATE UR QL: NEGATIVE
PH UR STRIP: 6.5 PH (ref 5–7)
SOURCE: NORMAL
SP GR UR STRIP: 1.01 (ref 1–1.03)
UROBILINOGEN UR STRIP-MCNC: NORMAL MG/DL (ref 0–2)

## 2018-09-18 PROCEDURE — 99284 EMERGENCY DEPT VISIT MOD MDM: CPT | Mod: 25

## 2018-09-18 PROCEDURE — 93976 VASCULAR STUDY: CPT

## 2018-09-18 PROCEDURE — 81003 URINALYSIS AUTO W/O SCOPE: CPT | Performed by: EMERGENCY MEDICINE

## 2018-09-18 PROCEDURE — 99211 OFF/OP EST MAY X REQ PHY/QHP: CPT

## 2018-09-18 RX ORDER — LEVOFLOXACIN 500 MG/1
500 TABLET, FILM COATED ORAL DAILY
Qty: 10 TABLET | Refills: 0 | Status: SHIPPED | OUTPATIENT
Start: 2018-09-18 | End: 2018-09-28

## 2018-09-18 ASSESSMENT — ENCOUNTER SYMPTOMS
DIARRHEA: 0
VOMITING: 0

## 2018-09-18 ASSESSMENT — PAIN SCALES - GENERAL: PAINLEVEL: WORST PAIN (10)

## 2018-09-18 NOTE — MR AVS SNAPSHOT
"              After Visit Summary   2018    Stefani Kirkpatrick    MRN: [id number]           Patient Information     Date Of Birth          1990        Visit Information        Provider Department      2018 4:45 PM EC RN [facility] [place]        Today's Diagnoses     Scrotal pain    -  1       Follow-ups after your visit        Who to contact     If you have questions or need follow up information about today's clinic visit or your schedule please contact [facility] [place] directly at [phone].  Normal or non-critical lab and imaging results will be communicated to you by [employer], letter or phone within 4 business days after the clinic has received the results. If you do not hear from us within 7 days, please contact the clinic through [employer] or phone. If you have a critical or abnormal lab result, we will notify you by phone as soon as possible.  Submit refill requests through [employer]ens or call your pharmacy and they will forward the refill request to us. Please allow 3 business days for your refill to be completed.          Additional Information About Your Visit        MyChart Information     [employer]ens lets you send messages to your doctor, view your test results, renew your prescriptions, schedule appointments and more. To sign up, go to [email or web address] . Click on \"Log in\" on the left side of the screen, which will take you to the Welcome page. Then click on \"Sign up Now\" on the right side of the page.     You will be asked to enter the access code listed below, as well as some personal information. Please follow the directions to create your username and password.     Your access code is: [email or web address]  Expires: 10/21/2018  4:01 PM     Your access code will  in 90 days. If you need help or a new code, please call your [facility] or [phone].        Care EveryWhere ID     This is your Care EveryWhere ID. This could be used by other organizations to access " your [facility] medical records  [id number]        Your Vitals Were     Pulse Temperature                67 97.9  F (36.6  C)           Blood Pressure from Last 3 Encounters:   09/18/18 117/85   09/18/18 113/74   07/23/18 118/80    Weight from Last 3 Encounters:   09/18/18 180 lb (81.6 kg)   07/23/18 187 lb (84.8 kg)   08/02/17 187 lb (84.8 kg)              Today, you had the following     No orders found for display       Primary Care Provider Office Phone # Fax #    Oliver Recinos [phone]       [address]  [place] [address]        Equal Access to Services     [facility]: Hadii sandra ordoñez hadmounikao Soancelmo, waaxda luqadaha, qaybta kaalmada adeegyada, thaddeus zavala . So [facility] [phone].    ATENCIÓN: Si habla español, tiene a munoz disposición servicios gratuitos de asistencia lingüística. Llame al [phone].    We comply with applicable federal civil rights laws and [place] laws. We do not discriminate on the basis of race, color, national origin, age, disability, sex, sexual orientation, or gender identity.            Thank you!     Thank you for choosing [facility] [place]  for your care. Our goal is always to provide you with excellent care. Hearing back from our patients is one way we can continue to improve our services. Please take a few minutes to complete the written survey that you may receive in the mail after your visit with us. Thank you!             Your Updated Medication List - Protect others around you: Learn how to safely use, store and throw away your medicines at www.disposemymeds.org.          This list is accurate as of 9/18/18  6:46 PM.  Always use your most recent med list.                   Brand Name Dispense Instructions for use Diagnosis    omeprazole 20 MG CR capsule    priLOSEC    30 capsule    Take 1 capsule by mouth daily. Take before meal.    Heartburn

## 2018-09-18 NOTE — NURSING NOTE
Stefani Kirkpatrick is a 28 year old male who presents with left testicular pain 10/10. States that he has been to the ER twice. Has been taking doxycycline BID and pain medication. States that the pain was previously on the right but has switched to the left.     NURSING ASSESSMENT:  Description:  above  Onset/duration:  Worsening last night and today  Precip. factors:   Epididymidis per last ER note   Associated symptoms:  Nausea and lower abdominal pain  Improves/worsens symptoms:  Nothing, tramadol not helping  Pain scale (0-10)   10/10    Last exam/Treatment:  9/8/18  Allergies:   Allergies   Allergen Reactions     Bactrim [Sulfamethoxazole W/Trimethoprim] Rash       MEDICATIONS:   Taking medication(s) as prescribed? Yes  Taking over the counter medication(s?) No  Any medication side effects? No significant side effects    Any barriers to taking medication(s) as prescribed?  No  Medication(s) improving/managing symptoms?  No  Medication reconciliation completed: No      NURSING PLAN: Huddle with provider, plan includes to ER per Dr. Lo due to need for imaging    RECOMMENDED DISPOSITION:  Call 911 - patient unable to drive self, no family or friends to call  Will comply with recommendation: Yes  If further questions/concerns or if symptoms do not improve, worsen or new symptoms develop, call your PCP or [facility] as soon as possible.    Kristel Zapata RN

## 2018-09-18 NOTE — ED TRIAGE NOTES
Pt here from clinic due to testicular pain. Pt states about a week ago pt was seen for right sided testicular pain but states now it has moved to the left side. Pt was prescribed doxycycline and has been taking it for the past 3-4 days. Clinic did US today and ruled out torsion.   Pt states naseated and pain moves up into belly. n

## 2018-09-18 NOTE — ED PROVIDER NOTES
History     Chief Complaint:  Testicular and Scrotal Pain    HPI   Stefani Kirkpatrick is a sexually active 28 year old male who presents with testicular and scrotal pain. The patient details that has experienced intermittent right sided testicular and scrotal pain for years and has seen a urologist for this, at which time he had otherwise normal scans. He presented to the ED on 9/8/18, at which time labs and imaging were obtained, noted below, and he was started on doxycycline for right sided pain. Right sided pain is gone but now having left sided pain. The patient details he is on day 5 of his antibiotics and has pain medications remaining.  The patient endorses urine decreased and is sexually active. Last sexual intercourse was 4 months ago with a female partner. Denies male partners. No penile discharge. He denies any vomiting, diarrhea, or concern for STI.     Laboratory Findings, 9/8/18:  Negative Urinalysis  Negative Gonorrheae Probe  Negative Chlamydia Probe    US Scrotum with duplex 9/8/18  Soft tissues in the region of the left spermatic cord appear mildly enlarged with increased blood flow. Differential considerations include acute inflammation of the spermatic cord versus incarcerated inguinal hernia. IV contrast enhanced pelvic CT may be useful for further evaluation if clinically indicated.     Allergies:  Bactrim     Medications:    Prilosec  Doxycyline    Past Medical History:    Testicle pain  Dysuria  H. Pylori infection  Abdominal bloating  Cardiovascular screening  Angular cheilitis     Past Surgical History:    Past surgical history reviewed. No pertinent past surgical history.     Family History:    Family history reviewed. No pertinent family history.    Social History:  Smoking Status: Never Smoker  Smokeless Tobacco: Never Used  Alcohol Use: No  Marital Status: Single      Review of Systems   Gastrointestinal: Negative for diarrhea and vomiting.   Genitourinary: Positive for decreased urine  "volume, penile pain and testicular pain.   All other systems reviewed and are negative.    Physical Exam     Patient Vitals for the past 24 hrs:   BP Temp Temp src Pulse Resp SpO2 Height Weight   09/18/18 2017 120/72 - - 72 18 99 % - -   09/18/18 1839 117/85 98.4  F (36.9  C) Oral 63 16 100 % 1.803 m (5' 11\") 81.6 kg (180 lb)     Physical Exam  General: Resting comfortably on the gurney  Eyes:  The pupils are equal and round    Conjunctivae and sclerae are normal  ENT:    Moist mucous membranes  Neck:  Normal range of motion  CV:  Regular rate and rhythm    Skin warm and well perfused   Resp:  Lungs are clear    Non-labored    No rales    No wheezing   GI:  Abdomen is soft, there is no rigidity    No distension    No rebound tenderness     No abdominal tenderness  :  Male tech in room during exam. Mild tenderness on proximal left testicle. No swelling or erythema. Right testicle normal. No abnormal lie of testicle. Cremasteric reflex intact. No inguinal hernias noted  MS:  Normal muscular tone  Skin:  No rash or acute skin lesions noted  Neuro:   Awake, alert.      Speech is normal and fluent.    Face is symmetric.     Moves all extremities equally  Psych: Normal affect.  Appropriate interactions.    Emergency Department Course     Imaging:  Radiology findings were communicated with the patient who voiced understanding of the findings.    US Testicular & Scrotum w Doppler Ltd  1. Normal testicles without torsion or mass.  2. Left epididymitis.  3. Bilateral epididymal head cysts.  VALERIA SUAREZ MD  Reading per radiology    Laboratory:  Laboratory findings were communicated with the patient who voiced understanding of the findings.    UA reflex to microscopic: WNL    Emergency Department Course:    1839 Nursing notes and vitals reviewed.    1857 I performed an exam of the patient as documented above.     1900 A urine sample was obtained for laboratory testing as documented above.    1931   The patient was sent for a " US Testicular & Scrotum w Doppler Ltd while in the emergency department, results above.     2028 I personally reviewed the labs and imaging results with the patient and answered all related questions prior to discharge.    Impression & Plan      Medical Decision Making:  Stefani Kirkpatrick is a 28 year old male who presents to the emergency department today for evaluation of testicle pain. Mild tenderness on left testicle. On antibiotics for possible epididymitis as he had right sided pain though ultrasound was negative last visit. STD testing done at last visit but negative. Has not been sexually active in 4 months. Has no abdominal tenderness on exam. UA unremarkable today. US scrotum shows left epididymitis and epididymal cysts. Given not sexually active recently and recent negative STD testing, suspect enteric organisms so started on levaquin. Discussed small risk of tendon rupture with patient. Patient clinically appears well. Recommended f/u with urology.     Diagnosis:    ICD-10-CM    1. Epididymitis N45.1      Disposition:   The patient is discharged to home.    Discharge Medications:  Discharge Medication List as of 9/18/2018  8:18 PM      START taking these medications    Details   levofloxacin (LEVAQUIN) 500 MG tablet Take 1 tablet (500 mg) by mouth daily for 10 days, Disp-10 tablet, R-0, Local Print           Scribe Disclosure:  I, Orla Severson, am serving as a scribe at 6:46 PM on 9/18/2018 to document services personally performed by Marla Cornejo MD based on my observations and the provider's statements to me.       EMERGENCY DEPARTMENT       Marla Cornejo MD  09/19/18 0047

## 2018-09-18 NOTE — ED NOTES
Bed: ED29  Expected date:   Expected time:   Means of arrival:   Comments:  HEMS 417 28M testicle pain

## 2018-09-18 NOTE — ED AVS SNAPSHOT
Emergency Department    [address] [facility] [address]    Phone:  [phone]    Fax:  [phone]                                       Stefani Kirkpatrick   MRN: [id number]    Department:   Emergency Department   Date of Visit:  9/18/2018           Patient Information     Date Of Birth          1990        Your diagnoses for this visit were:     Epididymitis        You were seen by Marla Cornejo MD.      Follow-up Information     Schedule an appointment as soon as possible for a visit with Saulo Watkins MD.    Specialty:  Urology    Contact information:    [facility] [employer] [facility]  [address]  [facility] [address] [phone]          Follow up with  Emergency Department.    Specialty:  EMERGENCY MEDICINE    Why:  If symptoms worsen    Contact information:    [address] [facility] [address] [phone]        Discharge Instructions         Discharge Instructions for Epididymitis  You have been diagnosed with epididymitis. This is an inflammation of a coiled tube called the epididymis that is located behind your testicle, inside the scrotum. The epididymis collects and stores sperm made by the testicles. Epididymitis is often caused by bacteria in the urinary tract or by bacteria passed between partners during sex. It can also be a complication of certain hospital procedures, or it can be caused by use of a urinary catheter. Here s what you need to do at home to care for yourself.  Home care    Be sure to finish all the medicine your healthcare provider prescribed -- even if you feel better. Not finishing the medicine can make your condition harder to treat or cause the condition to come back.    Rest in bed if you are uncomfortable. Discomfort should go away within 1 to 3 days of treatment. Swelling may persist for up to 2 months.    Ask your healthcare provider about pain medicine to keep you comfortable.    Use an ice pack or bag of  frozen peas to help relieve the pain. Wrap the peas or ice pack in a thin cloth and apply to the area.    Don t leave the ice pack on your skin for longer than 20 minutes, and don t use it more often than once every hour.    Elevate the scrotum with a rolled-up towel when you are resting.    For the first few days, wear an athletic supporter. When your pain subsides, wear briefs instead of boxers to better support the scrotum. This can help relieve pain.    Keep your penis and scrotum clean.    Use a condom to protect against sexually transmitted diseases (STDs).    If your condition was caused by an STD, be sure to inform your sexual partner(s).  Follow-up care  Make a follow-up appointment or as directed by your healthcare provider.  When to call your healthcare provider  Call your healthcare provider right away if you have any of the following:    Increased pain or swelling in the scrotum    Frequent urge or inability to urinate    Discharge from the penis    Pain during ejaculation    Fever above 100.4 F (38 C)   Date Last Reviewed: 1/1/2017 2000-2017 [employer]. [address] [place] [address]. All rights reserved. This information is not intended as a substitute for professional medical care. Always follow your healthcare professional's instructions.          24 Hour Appointment Hotline       To make an appointment at any [facility] clinic, call [phone] ([phone]). If you don't have a family doctor or clinic, we will help you find one. [facility] clinics are conveniently located to serve the needs of you and your family.             Review of your medicines      START taking        Dose / Directions Last dose taken    levofloxacin 500 MG tablet   Commonly known as:  LEVAQUIN   Dose:  500 mg   Quantity:  10 tablet        Take 1 tablet (500 mg) by mouth daily for 10 days   Refills:  0          Our records show that you are taking the medicines listed below. If these are  incorrect, please call your family doctor or clinic.        Dose / Directions Last dose taken    omeprazole 20 MG CR capsule   Commonly known as:  priLOSEC   Dose:  20 mg   Quantity:  30 capsule        Take 1 capsule by mouth daily. Take before meal.   Refills:  5                Prescriptions were sent or printed at these locations (1 Prescription)                   Other Prescriptions                Printed at Department/Unit printer (1 of 1)         levofloxacin (LEVAQUIN) 500 MG tablet                Procedures and tests performed during your visit     UA reflex to Microscopic    US Testicular & Scrotum w Doppler Ltd      Orders Needing Specimen Collection     None      Pending Results     No orders found from 9/16/2018 to 9/19/2018.            Pending Culture Results     No orders found from 9/16/2018 to 9/19/2018.            Pending Results Instructions     If you had any lab results that were not finalized at the time of your Discharge, you can call the ED Lab Result RN at [phone]. You will be contacted by this team for any positive Lab results or changes in treatment. The nurses are available 7 days a week from 10A to 6:30P.  You can leave a message 24 hours per day and they will return your call.        Test Results From Your Hospital Stay        9/18/2018  8:01 PM      Narrative     US TESTICULAR AND SCROTUM WITH DOPPLER LIMITED 9/18/2018 7:47 PM     HISTORY: Left-sided testicle pain.      FINDINGS: The testicles bilaterally are homogeneous in echotexture  without focal mass. The right testicle measures 4.1 x 3.3 x 1.8 cm.  The left testicle measures 3.9 x 3.1 x 2.0 cm. Color Doppler waveform  analysis demonstrates normal arterial waveforms within the testicles.  No evidence of testicular torsion. The right epididymis is normal, but  contains a simple-appearing epididymal head cyst measuring 0.6 cm. The  left epididymis is prominent in size with increased color Doppler flow  consistent with  epididymitis. Small epididymal head cyst measures only  0.3 cm. No varicoceles or hydroceles are evident.         Impression     IMPRESSION:    1. Normal testicles without torsion or mass.  2. Left epididymitis.  3. Bilateral epididymal head cysts.    VALERIA SUAREZ MD         9/18/2018  7:28 PM      Component Results     Component Value Ref Range & Units Status    Color Urine Light Yellow  Final    Appearance Urine Clear  Final    Glucose Urine Negative NEG^Negative mg/dL Final    Bilirubin Urine Negative NEG^Negative Final    Ketones Urine Negative NEG^Negative mg/dL Final    Specific Gravity Urine 1.006 1.003 - 1.035 Final    Blood Urine Negative NEG^Negative Final    pH Urine 6.5 5.0 - 7.0 pH Final    Protein Albumin Urine Negative NEG^Negative mg/dL Final    Urobilinogen mg/dL Normal 0.0 - 2.0 mg/dL Final    Nitrite Urine Negative NEG^Negative Final    Leukocyte Esterase Urine Negative NEG^Negative Final    Source Midstream Urine  Final                Clinical Quality Measure: Blood Pressure Screening     Your blood pressure was checked while you were in the emergency department today. The last reading we obtained was  BP: 120/72 . Please read the guidelines below about what these numbers mean and what you should do about them.  If your systolic blood pressure (the top number) is less than 120 and your diastolic blood pressure (the bottom number) is less than 80, then your blood pressure is normal. There is nothing more that you need to do about it.  If your systolic blood pressure (the top number) is 120-139 or your diastolic blood pressure (the bottom number) is 80-89, your blood pressure may be higher than it should be. You should have your blood pressure rechecked within a year by a primary care provider.  If your systolic blood pressure (the top number) is 140 or greater or your diastolic blood pressure (the bottom number) is 90 or greater, you may have high blood pressure. High blood pressure is treatable,  "but if left untreated over time it can put you at risk for heart attack, stroke, or kidney failure. You should have your blood pressure rechecked by a primary care provider within the next 4 weeks.  If your provider in the emergency department today gave you specific instructions to follow-up with your doctor or provider even sooner than that, you should follow that instruction and not wait for up to 4 weeks for your follow-up visit.        Thank you for choosing [facility]       Thank you for choosing [facility] for your care. Our goal is always to provide you with excellent care. Hearing back from our patients is one way we can continue to improve our services. Please take a few minutes to complete the written survey that you may receive in the mail after you visit with us. Thank you!        [employer] Information     [facility] lets you send messages to your doctor, view your test results, renew your prescriptions, schedule appointments and more. To sign up, go to [email or web address] . Click on \"Log in\" on the left side of the screen, which will take you to the Welcome page. Then click on \"Sign up Now\" on the right side of the page.     You will be asked to enter the access code listed below, as well as some personal information. Please follow the directions to create your username and password.     Your access code is: [email or web address]  Expires: 10/21/2018  4:01 PM     Your access code will  in 90 days. If you need help or a new code, please call your [facility] clinic or [phone].        Care EveryWhere ID     This is your Care EveryWhere ID. This could be used by other organizations to access your [facility] medical records  [id number]        Equal Access to Services     [facility] : Surya Murray, arnaud agarwal, thaddeus vale. So [facility] [phone].    ATENCIÓN: Si habla español, tiene a munoz disposición servicios gratuitos de asistencia " nellie Chamberlainvinod al [phone].    We comply with applicable federal civil rights laws and [place] laws. We do not discriminate on the basis of race, color, national origin, age, disability, sex, sexual orientation, or gender identity.            After Visit Summary       This is your record. Keep this with you and show to your community pharmacist(s) and doctor(s) at your next visit.

## 2018-09-18 NOTE — ED AVS SNAPSHOT
Emergency Department    [address] [place] [address]    Phone:  [phone]    Fax:  [phone]                                       Stefani Kirkpatrick   MRN: [id number]    Department:   Emergency Department   Date of Visit:  9/18/2018           After Visit Summary Signature Page     I have received my discharge instructions, and my questions have been answered. I have discussed any challenges I see with this plan with the nurse or doctor.    ..........................................................................................................................................  Patient/Patient Representative Signature      ..........................................................................................................................................  Patient Representative Print Name and Relationship to Patient    ..................................................               ................................................  Date                                   Time    ..........................................................................................................................................  Reviewed by Signature/Title    ...................................................              ..............................................  Date                                               Time          22EPIC Rev 08/18

## 2018-11-05 ENCOUNTER — TRANSFERRED RECORDS (OUTPATIENT)
Dept: HEALTH INFORMATION MANAGEMENT | Facility: CLINIC | Age: 28
End: 2018-11-05

## 2018-11-05 ENCOUNTER — MEDICAL CORRESPONDENCE (OUTPATIENT)
Dept: HEALTH INFORMATION MANAGEMENT | Facility: CLINIC | Age: 28
End: 2018-11-05

## 2018-11-08 ENCOUNTER — PRE VISIT (OUTPATIENT)
Dept: UROLOGY | Facility: CLINIC | Age: 28
End: 2018-11-08

## 2018-11-08 NOTE — TELEPHONE ENCOUNTER
MEDICAL RECORDS REQUEST   [facility]  Urology Clinic  [address] [place] [address]  PHONE: [phone]  Fax: [phone]        FUTURE VISIT INFORMATION                                                   Stefani Kirkpatrick, : 1990 scheduled for future visit at [facility]    APPOINTMENT INFORMATION:    Date: 2018    Provider:  Gracie ochoa    Reason for Visit/Diagnosis: Testicular pain    REFERRAL INFORMATION:    Referring provider:  Dr. Escobedo    Specialty: MD    Referring providers clinic:  [facility] contact number:  [phone];    RECORDS REQUESTED FOR VISIT                                                     NOTES  STATUS/DETAILS   OFFICE NOTE from referring provider  yes   OFFICE NOTE from other specialist  no   DISCHARGE SUMMARY from hospital  no   DISCHARGE REPORT from the ER  no   OPERATIVE REPORT  no   MEDICATION LIST  yes       PRE-VISIT CHECKLIST      Record collection complete Yes   Appointment appropriately scheduled           (right time/right provider) Yes   MyChart activation Yes   Questionnaire complete If no, please explain in process     Completed by: Kathy Lozoya

## 2018-11-13 ENCOUNTER — PRE VISIT (OUTPATIENT)
Dept: UROLOGY | Facility: CLINIC | Age: 28
End: 2018-11-13

## 2018-11-19 ENCOUNTER — OFFICE VISIT (OUTPATIENT)
Dept: UROLOGY | Facility: CLINIC | Age: 28
End: 2018-11-19

## 2018-11-19 VITALS
DIASTOLIC BLOOD PRESSURE: 73 MMHG | HEART RATE: 60 BPM | BODY MASS INDEX: 25.2 KG/M2 | WEIGHT: 180 LBS | HEIGHT: 71 IN | SYSTOLIC BLOOD PRESSURE: 119 MMHG

## 2018-11-19 DIAGNOSIS — N41.1 PROSTATITIS, CHRONIC: Primary | ICD-10-CM

## 2018-11-19 DIAGNOSIS — Z87.438 HISTORY OF EPIDIDYMITIS: ICD-10-CM

## 2018-11-19 LAB
ALBUMIN UR-MCNC: NEGATIVE MG/DL
APPEARANCE UR: CLEAR
BILIRUB UR QL STRIP: NEGATIVE
COLOR UR AUTO: YELLOW
GLUCOSE UR STRIP-MCNC: NEGATIVE MG/DL
HGB UR QL STRIP: NEGATIVE
KETONES UR STRIP-MCNC: NEGATIVE MG/DL
LEUKOCYTE ESTERASE UR QL STRIP: NEGATIVE
NITRATE UR QL: NEGATIVE
PH UR STRIP: 6 PH (ref 5–7)
SOURCE: NORMAL
SP GR UR STRIP: 1.02 (ref 1–1.03)
UROBILINOGEN UR STRIP-MCNC: 2 MG/DL (ref 0–2)

## 2018-11-19 RX ORDER — ALFUZOSIN HYDROCHLORIDE 10 MG/1
10 TABLET, EXTENDED RELEASE ORAL DAILY
Qty: 60 TABLET | Refills: 0 | Status: SHIPPED | OUTPATIENT
Start: 2018-11-19 | End: 2020-01-30

## 2018-11-19 RX ORDER — DOXYCYCLINE 100 MG/1
100 CAPSULE ORAL 2 TIMES DAILY
Qty: 60 CAPSULE | Refills: 0 | Status: SHIPPED | OUTPATIENT
Start: 2018-11-19 | End: 2018-12-19

## 2018-11-19 ASSESSMENT — PAIN SCALES - GENERAL: PAINLEVEL: MODERATE PAIN (5)

## 2018-11-19 NOTE — LETTER
11/19/2018       RE: Stefani Kirkpatrick  [address] [place] [address]     Dear Colleague,    Thank you for referring your patient, Stefani Kirkpatrick, to the [facility] UROLOGY AND INST FOR PROSTATE AND UROLOGIC CANCERS at [facility]. Please see a copy of my visit note below.    CC: scrotal pain     HPI: It is a pleasure to see . Stefani Kirkpatrick, a very pleasant 28 year old male seen in the urology clinic today in consultation from Lilia Dalton NP for evaluation of the above. Today's visit is conducted with the aid of a professional mBlox . The patient reports intermittent right-sided scrotal pain since 2014. When his pain flares, he also notices swelling in the region of the right testicle and epididymis. Pain can radiate up the right cord structures and into the right inguinal canal. He also complains of a small mass within the right hemiscrotum that varies from soft to hard. He denies pain in the left scrotum. He has been treated for epididymitis on numerous occasions over the past few years including twice within the past 2 months (see ED visits from 9/8/18 and 9/18/18). Epididymitis was confirmed with ultrasound findings; interestingly, both times on the left side, despite him complaining of right-sided pain. Scrotal ultrasound also demonstrated bilateral epididymal head cysts. He was treated with 2 weeks of Doxycycline on 9/8/18 with improvement in his symptoms but then symptoms recurred after stopping antibiotics. He was then treated with Levaquin x 2 weeks on 9/18/18, again with improvement in his symptoms until stopping abx at which point symptoms recur. Most recently, he was treated with IM ceftriaxone x 1 and another 10 day course of Doxycycline. As before, his symptoms improve while taking antibiotics and then recur once he stops them. He does complain of some burning pain in the right scrotum as well as perineum today. Symptoms often get worse  "after ejaculation.     Denies dysuria, hematuria, penile discharge, intermittency, or sense of incomplete bladder emptying. Mild urinary hesitancy on occasion but he reports that his stream is strong. He is  with no recent new sexual partners or concern for STI's. Reports that his wife has had a few infections recently as well.     Past Medical History:   Diagnosis Date     NO ACTIVE PROBLEMS      Past Surgical History:   Procedure Laterality Date     APPENDECTOMY       Bactrim [sulfamethoxazole w/trimethoprim] and Sulfamethoxazole-trimethoprim  Current Outpatient Prescriptions   Medication     omeprazole (PRILOSEC) 20 MG capsule     No current facility-administered medications for this visit.      Family History: There is no h/o  malignancy.  There is no h/o urolithiasis.     Social History: The patient does not smoke cigarettes, no EtOH and no illicit drug use.    PHYSICAL EXAM:  Vitals:    11/19/18 1001   BP: 119/73   Pulse: 60   Weight: 81.6 kg (180 lb)   Height: 1.803 m (5' 11\")     GENERAL: Well developed, well nourished, male in no acute distress.  HEENT: Atraumatic, normocephalic.  RESP: No evidence of respiratory distress.  CV: RRR, no murmurs/gallops/rubs.   LYMPH: Negative inguinal lymph nodes.  ABDOMEN:  Soft, nontender, no HSM or palpable masses.  No hernias.  + BS.  :      Circumcised penis, no penile plaques or lesions. Mild leftward curvature.     Orthotopic location of the urethral meatus.     Scrotum normal without overlying erythema.     Testes descended bilaterally and of normal firmness and consistency, no masses. Right testis is mildly tender to palpation.     Left epididymis normal to palpation without tenderness, possible small cyst palpable. Right epididymis is mildly tender to palpation.     Vas and cord normal bilaterally.  SE: Good sphincter tone, smooth rectal mucosa. Normal size prostate without nodules, mass, or asymmetry, but some tenderness to palpation as well as mild " maxwellginess appreciated.   PSYCH: Alert and oriented x 3, mood and affect normal.  Patient pleasant and agreeable during interview and exam today.     Imaging:   US TESTICULAR AND SCROTUM WITH DOPPLER LIMITED 9/18/2018   IMPRESSION:    1. Normal testicles without torsion or mass.  2. Left epididymitis.  3. Bilateral epididymal head cysts.    ASSESSMENT/PLAN:  Mr. Stefani Kirkpatrick is a 28 year old male with chronic right-sided scrotal pain, recurrent epididymitis, bilateral epididymal head cysts, as well as possible prostatitis based today's SE. An underlying prostatitis could possibly explain the recurrent epididymal infections. We therefore discussed and will plan for the following:  -Send urine for UA, ureaplasma, mycoplasma. Will not test for GC/CT again given negative results from 9/8/18 with no new partners.   -While awaiting ureaplasma/mycoplasma cultures, will start on Doxycycline 100 mg BID x 30 days for possible prostatitis as this will cover for these pathogens if isolated. If cultures return negative, consider switching to a fluoroquinolone to cover enteric pathogens.   -Will also start alfuzosin 10 mg once daily.  -Encouraged NSAIDS PRN, warm soaks, and avoidance of aggravating activities.    Follow up in 3 months to recheck, sooner with any issues.    Thank you for the opportunity to participate in the care of this very pleasant patient. I will keep you updated on his progress.    Gracie Carrion PA-C  Department of Urology.

## 2018-11-19 NOTE — PATIENT INSTRUCTIONS
UROLOGY CLINIC VISIT PATIENT INSTRUCTIONS    1) Follow up with me (Gracie Carrion PA-C) in 3 months.    2) Start taking Doxycycline twice per day (one in the morning, one at night) for 30 days. This ws sent to your pharmacy.    3) Start taking alfuzosin (Uroxatrol) 10 mg once per day in the evening. This was sent to your pharmacy.    4) We will contact you with urine test results in about 1 week.    If you have any issues, questions or concerns in the meantime, do not hesitate to contact us at [phone] or via [employer].     It was a pleasure meeting with you today.  Thank you for allowing me and my team the privilege of caring for you today.  YOU are the reason we are here, and I truly hope we provided you with the excellent service you deserve.  Please let us know if there is anything else we can do for you so that we can be sure you are leaving completely satisfied with your care experience.

## 2018-11-19 NOTE — PROGRESS NOTES
CC: scrotal pain     HPI: It is a pleasure to see Mr. Stefani Kirkpatrick, a very pleasant 28 year old male seen in the urology clinic today in consultation from Lilia Dalton NP for evaluation of the above. Today's visit is conducted with the aid of a professional [facility] . The patient reports intermittent right-sided scrotal pain since 2014. When his pain flares, he also notices swelling in the region of the right testicle and epididymis. Pain can radiate up the right cord structures and into the right inguinal canal. He also complains of a small mass within the right hemiscrotum that varies from soft to hard. He denies pain in the left scrotum. He has been treated for epididymitis on numerous occasions over the past few years including twice within the past 2 months (see ED visits from 9/8/18 and 9/18/18). Epididymitis was confirmed with ultrasound findings; interestingly, both times on the left side, despite him complaining of right-sided pain. Scrotal ultrasound also demonstrated bilateral epididymal head cysts. He was treated with 2 weeks of Doxycycline on 9/8/18 with improvement in his symptoms but then symptoms recurred after stopping antibiotics. He was then treated with Levaquin x 2 weeks on 9/18/18, again with improvement in his symptoms until stopping abx at which point symptoms recur. Most recently, he was treated with IM ceftriaxone x 1 and another 10 day course of Doxycycline. As before, his symptoms improve while taking antibiotics and then recur once he stops them. He does complain of some burning pain in the right scrotum as well as perineum today. Symptoms often get worse after ejaculation.     Denies dysuria, hematuria, penile discharge, intermittency, or sense of incomplete bladder emptying. Mild urinary hesitancy on occasion but he reports that his stream is strong. He is  with no recent new sexual partners or concern for STI's. Reports that his wife has had a few infections  "recently as well.     Past Medical History:   Diagnosis Date     NO ACTIVE PROBLEMS      Past Surgical History:   Procedure Laterality Date     APPENDECTOMY       Bactrim [sulfamethoxazole w/trimethoprim] and Sulfamethoxazole-trimethoprim  Current Outpatient Prescriptions   Medication     omeprazole (PRILOSEC) 20 MG capsule     No current facility-administered medications for this visit.      Family History: There is no h/o  malignancy.  There is no h/o urolithiasis.     Social History: The patient does not smoke cigarettes, no EtOH and no illicit drug use.    ROS:  A comprehensive 10 point review of systems was obtained and was negative except for that outlined above in the HPI.    PHYSICAL EXAM:  Vitals:    11/19/18 1001   BP: 119/73   Pulse: 60   Weight: 81.6 kg (180 lb)   Height: 1.803 m (5' 11\")     GENERAL: Well developed, well nourished, male in no acute distress.  HEENT: Atraumatic, normocephalic.  RESP: No evidence of respiratory distress.  CV: RRR, no murmurs/gallops/rubs.   LYMPH: Negative inguinal lymph nodes.  ABDOMEN:  Soft, nontender, no HSM or palpable masses.  No hernias.  + BS.  :      Circumcised penis, no penile plaques or lesions. Mild leftward curvature.     Orthotopic location of the urethral meatus.     Scrotum normal without overlying erythema.     Testes descended bilaterally and of normal firmness and consistency, no masses. Right testis is mildly tender to palpation.     Left epididymis normal to palpation without tenderness, possible small cyst palpable. Right epididymis is mildly tender to palpation.     Vas and cord normal bilaterally.  SE: Good sphincter tone, smooth rectal mucosa. Normal size prostate without nodules, mass, or asymmetry, but some tenderness to palpation as well as mild bogginess appreciated.   PSYCH: Alert and oriented x 3, mood and affect normal.  Patient pleasant and agreeable during interview and exam today.     Imaging:   US TESTICULAR AND SCROTUM WITH " DOPPLER LIMITED 9/18/2018   IMPRESSION:    1. Normal testicles without torsion or mass.  2. Left epididymitis.  3. Bilateral epididymal head cysts.    ASSESSMENT/PLAN:  Mr. Stefani Kirkpatrick is a 28 year old male with chronic right-sided scrotal pain, recurrent epididymitis, bilateral epididymal head cysts, as well as possible prostatitis based today's SE. An underlying prostatitis could possibly explain the recurrent epididymal infections. We therefore discussed and will plan for the following:  -Send urine for UA, ureaplasma, mycoplasma. Will not test for GC/CT again given negative results from 9/8/18 with no new partners.   -While awaiting ureaplasma/mycoplasma cultures, will start on Doxycycline 100 mg BID x 30 days for possible prostatitis as this will cover for these pathogens if isolated. If cultures return negative, consider switching to a fluoroquinolone to cover enteric pathogens.   -Will also start alfuzosin 10 mg once daily.  -Encouraged NSAIDS PRN, warm soaks, and avoidance of aggravating activities.    Follow up in 3 months to recheck, sooner with any issues.    Thank you for the opportunity to participate in the care of this very pleasant patient. I will keep you updated on his progress.    Gracie Carrion PA-C  Department of Urology.

## 2018-11-19 NOTE — MR AVS SNAPSHOT
"              After Visit Summary   11/19/2018    Stefani Kirkpatrick    MRN: [id number]           Patient Information     Date Of Birth          1990        Visit Information        Provider Department      11/19/2018 9:15 AM Maximiliano Alvarez; Gracie Carrion PA-C [facility] and [facility]        Today's Diagnoses     Prostatitis, chronic    -  1      Care Instructions    UROLOGY CLINIC VISIT PATIENT INSTRUCTIONS    1) Follow up with me (Gracie Carrion PA-C) in 3 months.    2) Start taking Doxycycline twice per day (one in the morning, one at night) for 30 days. This ws sent to your pharmacy.    3) Start taking alfuzosin (Uroxatrol) 10 mg once per day in the evening. This was sent to your pharmacy.    4) We will contact you with urine test results in about 1 week.    If you have any issues, questions or concerns in the meantime, do not hesitate to contact us at [phone] or via [employer].     It was a pleasure meeting with you today.  Thank you for allowing me and my team the privilege of caring for you today.  YOU are the reason we are here, and I truly hope we provided you with the excellent service you deserve.  Please let us know if there is anything else we can do for you so that we can be sure you are leaving completely satisfied with your care experience.                Follow-ups after your visit        Who to contact     Please call your clinic at [phone] to:    Ask questions about your health    Make or cancel appointments    Discuss your medicines    Learn about your test results    Speak to your doctor            Additional Information About Your Visit        Care EveryWhere ID     This is your Care EveryWhere ID. This could be used by other organizations to access your [place] [facility] medical records  [id number]        Your Vitals Were     Pulse Height BMI (Body Mass Index)             60 1.803 m (5' 11\") 25.1 kg/m2          Blood Pressure from Last 3 Encounters: "   11/19/18 119/73   09/18/18 120/72   09/18/18 113/74    Weight from Last 3 Encounters:   11/19/18 81.6 kg (180 lb)   09/18/18 81.6 kg (180 lb)   07/23/18 84.8 kg (187 lb)              We Performed the Following     Mycoplasma large colony culture     UA reflex to Microscopic and Culture     Ureaplasma culture          Today's Medication Changes          These changes are accurate as of 11/19/18 10:41 AM.  If you have any questions, ask your nurse or doctor.               Start taking these medicines.        Dose/Directions    alfuzosin 10 MG 24 hr tablet   Commonly known as:  UROXATRAL   Used for:  Prostatitis, chronic   Started by:  Gracie Carrion PA-C        Dose:  10 mg   Take 1 tablet (10 mg) by mouth daily   Quantity:  60 tablet   Refills:  0       doxycycline monohydrate 100 MG capsule   Commonly known as:  MONDOXYNE NL   Used for:  Prostatitis, chronic   Started by:  Gracie Carrion PA-C        Dose:  100 mg   Take 1 capsule (100 mg) by mouth 2 times daily   Quantity:  60 capsule   Refills:  0            Where to get your medicines      These medications [facility] [address] [facility]  [address] [facility] [place] [facility] [address]    Hours:  Added 10/26 CK Checked with pharmacy Phone:  [phone]     alfuzosin 10 MG 24 hr tablet    doxycycline monohydrate 100 MG capsule                Primary Care Provider Fax #    Physician No Ref-Primary [phone]       No address on file        Equal Access to Services     SHAKILA ROBERTS AH: Hadii aad ku hadasho Soomaali, waaxda luqadaha, qaybta kaalmada adeegyada, waxay grady zavala . So [facility] [phone].    ATENCIÓN: Si habla español, tiene a munoz disposición servicios gratuitos de asistencia lingüística. Llame al [phone].    We comply with applicable federal civil rights laws and [place] laws. We do not discriminate on the basis of race, color, national origin, age, disability, sex,  sexual orientation, or gender identity.            Thank you!     Thank you for choosing [facility] UROLOGY AND [facility] FOR PROSTATE AND UROLOGIC CANCERS  for your care. Our goal is always to provide you with excellent care. Hearing back from our patients is one way we can continue to improve our services. Please take a few minutes to complete the written survey that you may receive in the mail after your visit with us. Thank you!             Your Updated Medication List - Protect others around you: Learn how to safely use, store and throw away your medicines at www.disposemymeds.org.          This list is accurate as of 11/19/18 10:41 AM.  Always use your most recent med list.                   Brand Name Dispense Instructions for use Diagnosis    alfuzosin 10 MG 24 hr tablet    UROXATRAL    60 tablet    Take 1 tablet (10 mg) by mouth daily    Prostatitis, chronic       doxycycline monohydrate 100 MG capsule    MONDOXYNE NL    60 capsule    Take 1 capsule (100 mg) by mouth 2 times daily    Prostatitis, chronic       omeprazole 20 MG CR capsule    priLOSEC    30 capsule    Take 1 capsule by mouth daily. Take before meal.    Heartburn

## 2018-11-26 LAB
BACTERIA SPEC CULT: NORMAL
BACTERIA SPEC CULT: NORMAL
SPECIMEN SOURCE: NORMAL
SPECIMEN SOURCE: NORMAL

## 2019-02-04 ENCOUNTER — PRE VISIT (OUTPATIENT)
Dept: UROLOGY | Facility: CLINIC | Age: 29
End: 2019-02-04

## 2020-01-30 ENCOUNTER — OFFICE VISIT (OUTPATIENT)
Dept: FAMILY MEDICINE | Facility: CLINIC | Age: 30
End: 2020-01-30
Payer: COMMERCIAL

## 2020-01-30 VITALS
DIASTOLIC BLOOD PRESSURE: 72 MMHG | SYSTOLIC BLOOD PRESSURE: 124 MMHG | HEART RATE: 90 BPM | TEMPERATURE: 98.9 F | BODY MASS INDEX: 28 KG/M2 | HEIGHT: 71 IN | WEIGHT: 200 LBS | OXYGEN SATURATION: 100 %

## 2020-01-30 DIAGNOSIS — J20.9 ACUTE BRONCHITIS, UNSPECIFIED ORGANISM: ICD-10-CM

## 2020-01-30 DIAGNOSIS — J02.9 SORE THROAT: Primary | ICD-10-CM

## 2020-01-30 LAB
DEPRECATED S PYO AG THROAT QL EIA: NORMAL
FLUAV+FLUBV AG SPEC QL: NEGATIVE
FLUAV+FLUBV AG SPEC QL: NEGATIVE
SPECIMEN SOURCE: NORMAL
SPECIMEN SOURCE: NORMAL

## 2020-01-30 PROCEDURE — 87880 STREP A ASSAY W/OPTIC: CPT | Performed by: FAMILY MEDICINE

## 2020-01-30 PROCEDURE — 87804 INFLUENZA ASSAY W/OPTIC: CPT | Performed by: FAMILY MEDICINE

## 2020-01-30 PROCEDURE — 87081 CULTURE SCREEN ONLY: CPT | Performed by: FAMILY MEDICINE

## 2020-01-30 PROCEDURE — 99213 OFFICE O/P EST LOW 20 MIN: CPT | Performed by: FAMILY MEDICINE

## 2020-01-30 RX ORDER — AZITHROMYCIN 250 MG/1
TABLET, FILM COATED ORAL
Qty: 6 TABLET | Refills: 0 | Status: SHIPPED | OUTPATIENT
Start: 2020-01-30

## 2020-01-30 ASSESSMENT — MIFFLIN-ST. JEOR: SCORE: 1879.32

## 2020-01-30 NOTE — PROGRESS NOTES
"Subjective     Stefani Kirkpatrick is a 30 year old male who presents to clinic today for the following health issues:    HPI   Acute Illness   Acute illness concerns: sore throat    Onset: x 2 weeks    Fever: YES- did not take it    Chills/Sweats: YES    Headache (location?): no     Sinus Pressure:no    Conjunctivitis:  no    Ear Pain: no    Rhinorrhea: no    Congestion: no    Sore Throat: YES     Cough: YES-non-productive    Wheeze: no    Decreased Appetite: no    Nausea: no    Vomiting: no    Diarrhea:  no    Dysuria/Freq.: no    Fatigue/Achiness: YES    Sick/Strep Exposure: YES     Therapies Tried and outcome: Tylenol              Reviewed and updated as needed this visit by Provider         Review of Systems   ROS COMP: Constitutional, HEENT, cardiovascular, pulmonary, gi and gu systems are negative, except as otherwise noted.      Objective    /72   Pulse 90   Temp 98.9  F (37.2  C) (Tympanic)   Ht 1.803 m (5' 11\")   Wt 90.7 kg (200 lb)   SpO2 100%   BMI 27.89 kg/m    Body mass index is 27.89 kg/m .  Physical Exam   GENERAL: healthy, alert and no distress  NECK: no adenopathy, no asymmetry, masses, or scars and thyroid normal to palpation  Slight erythema in the throat.  RESP: lungs clear to auscultation - no rales, rhonchi or wheezes  CV: regular rate and rhythm, normal S1 S2, no S3 or S4, no murmur, click or rub, no peripheral edema and peripheral pulses strong              Assessment & Plan     1. Sore throat    - Strep, Rapid Screen  - Influenza A/B antigen  - Beta strep group A culture    2. Acute bronchitis, unspecified organism  Patient rapid strep and flu is negative however he still have some cough congestion symptoms.  The symptoms are ongoing for the last 2 weeks.  Due to duration I would suggest azithromycin to help with some bronchitis.  Follow-up if symptoms are not getting better  - azithromycin (ZITHROMAX) 250 MG tablet; Two tablets first day, then one tablet daily for four days.  " "Dispense: 6 tablet; Refill: 0     BMI:   Estimated body mass index is 27.89 kg/m  as calculated from the following:    Height as of this encounter: 1.803 m (5' 11\").    Weight as of this encounter: 90.7 kg (200 lb).     Oliver Recinos MD  [facility]      "

## 2020-01-31 LAB
BACTERIA SPEC CULT: NORMAL
SPECIMEN SOURCE: NORMAL

## 2020-09-03 NOTE — DISCHARGE INSTRUCTIONS
Discharge Instructions for Epididymitis  You have been diagnosed with epididymitis. This is an inflammation of a coiled tube called the epididymis that is located behind your testicle, inside the scrotum. The epididymis collects and stores sperm made by the testicles. Epididymitis is often caused by bacteria in the urinary tract or by bacteria passed between partners during sex. It can also be a complication of certain hospital procedures, or it can be caused by use of a urinary catheter. Here s what you need to do at home to care for yourself.  Home care    Be sure to finish all the medicine your healthcare provider prescribed -- even if you feel better. Not finishing the medicine can make your condition harder to treat or cause the condition to come back.    Rest in bed if you are uncomfortable. Discomfort should go away within 1 to 3 days of treatment. Swelling may persist for up to 2 months.    Ask your healthcare provider about pain medicine to keep you comfortable.    Use an ice pack or bag of frozen peas to help relieve the pain. Wrap the peas or ice pack in a thin cloth and apply to the area.    Don t leave the ice pack on your skin for longer than 20 minutes, and don t use it more often than once every hour.    Elevate the scrotum with a rolled-up towel when you are resting.    For the first few days, wear an athletic supporter. When your pain subsides, wear briefs instead of boxers to better support the scrotum. This can help relieve pain.    Keep your penis and scrotum clean.    Use a condom to protect against sexually transmitted diseases (STDs).    If your condition was caused by an STD, be sure to inform your sexual partner(s).  Follow-up care  Make a follow-up appointment or as directed by your healthcare provider.  When to call your healthcare provider  Call your healthcare provider right away if you have any of the following:    Increased pain or swelling in the scrotum    Frequent urge or  inability to urinate    Discharge from the penis    Pain during ejaculation    Fever above 100.4 F (38 C)   Date Last Reviewed: 1/1/2017 2000-2017 [employer]. [address] [place] [id number]. All rights reserved. This information is not intended as a substitute for professional medical care. Always follow your healthcare professional's instructions.         [Routine On-Treatment] : a routine on-treatment visit for [Other: ___] : [unfilled]

## 2022-10-09 NOTE — PROGRESS NOTES
"  SUBJECTIVE:   Stefani Kirkpatrick is a 28 year old male who presents to clinic today for the following health issues:      Check groin pain on right side-testicle area-pain for about 3 years?                   This patient is here with an .  He is describing right scrotal pain, intermittently for about 3 years.  He states he has seen other providers about this including a urologist.   I do not see a urology note in the record,but he saw a primary care provider 2 years ago and an ultrasound was advised but patient did not follow through on that.    Problem list and histories reviewed & adjusted, as indicated.      Current Outpatient Prescriptions   Medication Sig Dispense Refill     omeprazole (PRILOSEC) 20 MG capsule Take 1 capsule by mouth daily. Take before meal. 30 capsule 5     Allergies   Allergen Reactions     Nkda [No Known Drug Allergies]      Bactrim [Sulfamethoxazole W/Trimethoprim] Rash     BP Readings from Last 3 Encounters:   07/23/18 118/80   08/02/17 (!) 128/98   07/29/17 118/68    Wt Readings from Last 3 Encounters:   07/23/18 187 lb (84.8 kg)   08/02/17 187 lb (84.8 kg)   07/29/17 190 lb (86.2 kg)                    Reviewed and updated as needed this visit by clinical staff       Reviewed and updated as needed this visit by Provider         ROS:  CONSTITUTIONAL:NEGATIVE for fever, chills, change in weight  : negative for dysuria, hematuria, decreased urinary stream    OBJECTIVE:                                                    /80 (BP Location: Left arm, Patient Position: Chair, Cuff Size: Adult Regular)  Pulse 74  Temp 98.7  F (37.1  C)  Resp 20  Ht 5' 11\" (1.803 m)  Wt 187 lb (84.8 kg)  SpO2 99%  BMI 26.08 kg/m2  Body mass index is 26.08 kg/(m^2).  GENERAL APPEARANCE: healthy, alert and no distress   (male): testicles normal without atrophy or masses or tenderness, no hernias and penis normal without urethral discharge    Diagnostic test results:  none  " The patient is Unstable - High likelihood or risk of patient condition declining or worsening    Shift Goals  Clinical Goals: MAP >65  Patient Goals: pain control  Family Goals: update from MD    Progress made toward(s) clinical / shift goals:    Problem: Knowledge Deficit - Standard  Goal: Patient and family/care givers will demonstrate understanding of plan of care, disease process/condition, diagnostic tests and medications  Outcome: Not Progressing     Problem: Psychosocial  Goal: Patient's ability to verbalize feelings about condition will improve  Outcome: Not Progressing  Goal: Patient's ability to re-evaluate and adapt role responsibilities will improve  Outcome: Not Progressing     Problem: Hemodynamics  Goal: Patient's hemodynamics, fluid balance and neurologic status will be stable or improve  Outcome: Not Progressing     Problem: Respiratory  Goal: Patient will achieve/maintain optimum respiratory ventilation and gas exchange  Outcome: Not Progressing     Problem: Bowel Elimination  Goal: Establish and maintain regular bowel function  Outcome: Not Progressing     Problem: Pain - Standard  Goal: Alleviation of pain or a reduction in pain to the patient’s comfort goal  Outcome: Progressing     Problem: Fall Risk  Goal: Patient will remain free from falls  Outcome: Progressing     Problem: Skin Integrity  Goal: Skin integrity is maintained or improved  Outcome: Progressing     Problem: Psychosocial  Goal: Patient's level of anxiety will decrease  Outcome: Progressing  Goal: Patient and family will demonstrate ability to cope with life altering diagnosis and/or procedure  Outcome: Progressing  Goal: Spiritual and cultural needs incorporated into hospitalization  Outcome: Progressing     Problem: Mechanical Ventilation  Goal: Safe management of artificial airway and ventilation  Outcome: Progressing  Goal: Successful weaning off mechanical ventilator, spontaneously maintains adequate gas exchange  Outcome:      ASSESSMENT/PLAN:                                                        ICD-10-CM    1. Scrotal pain N50.82 RADIOLOGY REFERRAL       I do not detect any pathology, but we will go ahead with an ultrasound.  Patient Instructions   Have the ultrasound of your scrotum/testicles done.                I will let you know the result.       Nolberto Conway MD  [facility] [place] [facility]   Progressing  Goal: Patient will be able to express needs and understand communication  Outcome: Progressing     Problem: Risk for Aspiration  Goal: Patient's risk for aspiration will be absent or decrease  Outcome: Progressing     Problem: Urinary - Renal Perfusion  Goal: Ability to achieve and maintain adequate renal perfusion and functioning will improve  Outcome: Progressing     Problem: Venous Thromboembolism (VTE) Prevention  Goal: The patient will remain free from venous thromboembolism (VTE)  Outcome: Progressing     Problem: Nutrition  Goal: Patient's nutritional and fluid intake will be adequate or improve  Outcome: Progressing     Problem: Urinary Elimination  Goal: Establish and maintain regular urinary output  Outcome: Progressing       Patient is not progressing towards the following goals:      Problem: Knowledge Deficit - Standard  Goal: Patient and family/care givers will demonstrate understanding of plan of care, disease process/condition, diagnostic tests and medications  Outcome: Not Progressing     Problem: Psychosocial  Goal: Patient's ability to verbalize feelings about condition will improve  Outcome: Not Progressing  Goal: Patient's ability to re-evaluate and adapt role responsibilities will improve  Outcome: Not Progressing     Problem: Hemodynamics  Goal: Patient's hemodynamics, fluid balance and neurologic status will be stable or improve  Outcome: Not Progressing     Problem: Respiratory  Goal: Patient will achieve/maintain optimum respiratory ventilation and gas exchange  Outcome: Not Progressing     Problem: Bowel Elimination  Goal: Establish and maintain regular bowel function  Outcome: Not Progressing

## 2023-02-26 ENCOUNTER — HOSPITAL ENCOUNTER (EMERGENCY)
Facility: CLINIC | Age: 33
Discharge: HOME | End: 2023-02-27
Attending: EMERGENCY MEDICINE | Admitting: EMERGENCY MEDICINE
Payer: COMMERCIAL

## 2023-02-26 VITALS
OXYGEN SATURATION: 97 % | TEMPERATURE: 99.2 F | DIASTOLIC BLOOD PRESSURE: 84 MMHG | RESPIRATION RATE: 16 BRPM | BODY MASS INDEX: 27.06 KG/M2 | SYSTOLIC BLOOD PRESSURE: 127 MMHG | HEART RATE: 89 BPM | WEIGHT: 194 LBS

## 2023-02-26 DIAGNOSIS — S06.0X0A CONCUSSION WITHOUT LOSS OF CONSCIOUSNESS, INITIAL ENCOUNTER: ICD-10-CM

## 2023-02-26 PROCEDURE — 99282 EMERGENCY DEPT VISIT SF MDM: CPT

## 2023-02-26 RX ORDER — ACETAMINOPHEN 325 MG/1
650 TABLET ORAL ONCE
Status: DISCONTINUED | OUTPATIENT
Start: 2023-02-27 | End: 2023-02-27 | Stop reason: HOSPADM
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
## 2023-03-05 ENCOUNTER — APPOINTMENT (OUTPATIENT)
Dept: GENERAL RADIOLOGY | Facility: CLINIC | Age: 33
End: 2023-03-05
Attending: EMERGENCY MEDICINE

## 2023-03-05 ENCOUNTER — HOSPITAL ENCOUNTER (EMERGENCY)
Facility: CLINIC | Age: 33
Discharge: HOME | End: 2023-03-05
Attending: EMERGENCY MEDICINE | Admitting: EMERGENCY MEDICINE

## 2023-03-05 VITALS
TEMPERATURE: 97.9 F | RESPIRATION RATE: 18 BRPM | DIASTOLIC BLOOD PRESSURE: 91 MMHG | WEIGHT: 190 LBS | HEART RATE: 67 BPM | BODY MASS INDEX: 26.6 KG/M2 | SYSTOLIC BLOOD PRESSURE: 135 MMHG | OXYGEN SATURATION: 98 % | HEIGHT: 71 IN

## 2023-03-05 DIAGNOSIS — S16.1XXA STRAIN OF NECK MUSCLE, INITIAL ENCOUNTER: ICD-10-CM
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
## 2023-03-05 PROCEDURE — 72100 X-RAY EXAM L-S SPINE 2/3 VWS: CPT

## 2023-03-05 PROCEDURE — 99284 EMERGENCY DEPT VISIT MOD MDM: CPT

## 2023-03-05 PROCEDURE — 72040 X-RAY EXAM NECK SPINE 2-3 VW: CPT

## 2023-03-05 ASSESSMENT — ACTIVITIES OF DAILY LIVING (ADL)
ADLS_ACUITY_SCORE: 35
ADLS_ACUITY_SCORE: 35
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
